# Patient Record
Sex: FEMALE | Race: OTHER | HISPANIC OR LATINO | URBAN - METROPOLITAN AREA
[De-identification: names, ages, dates, MRNs, and addresses within clinical notes are randomized per-mention and may not be internally consistent; named-entity substitution may affect disease eponyms.]

---

## 2019-04-24 ENCOUNTER — EMERGENCY (EMERGENCY)
Facility: HOSPITAL | Age: 59
LOS: 0 days | Discharge: HOME | End: 2019-04-24
Payer: SELF-PAY

## 2019-04-24 VITALS
RESPIRATION RATE: 18 BRPM | DIASTOLIC BLOOD PRESSURE: 76 MMHG | TEMPERATURE: 98 F | OXYGEN SATURATION: 100 % | WEIGHT: 130.07 LBS | SYSTOLIC BLOOD PRESSURE: 146 MMHG | HEART RATE: 64 BPM | HEIGHT: 62 IN

## 2019-04-24 PROCEDURE — 70450 CT HEAD/BRAIN W/O DYE: CPT | Mod: 26

## 2019-04-24 PROCEDURE — 99284 EMERGENCY DEPT VISIT MOD MDM: CPT

## 2019-04-24 PROCEDURE — 70486 CT MAXILLOFACIAL W/O DYE: CPT | Mod: 26

## 2019-04-24 PROCEDURE — 76377 3D RENDER W/INTRP POSTPROCES: CPT | Mod: 26

## 2019-04-24 PROCEDURE — 72125 CT NECK SPINE W/O DYE: CPT | Mod: 26

## 2019-04-24 RX ORDER — IBUPROFEN 200 MG
1 TABLET ORAL
Qty: 28 | Refills: 0
Start: 2019-04-24 | End: 2019-04-30

## 2019-04-24 RX ORDER — IBUPROFEN 200 MG
600 TABLET ORAL ONCE
Qty: 0 | Refills: 0 | Status: COMPLETED | OUTPATIENT
Start: 2019-04-24 | End: 2019-04-24

## 2019-04-24 RX ORDER — CYCLOBENZAPRINE HYDROCHLORIDE 10 MG/1
5 TABLET, FILM COATED ORAL ONCE
Qty: 0 | Refills: 0 | Status: COMPLETED | OUTPATIENT
Start: 2019-04-24 | End: 2019-04-24

## 2019-04-24 RX ORDER — CYCLOBENZAPRINE HYDROCHLORIDE 10 MG/1
1 TABLET, FILM COATED ORAL
Qty: 20 | Refills: 0
Start: 2019-04-24 | End: 2019-05-03

## 2019-04-24 RX ADMIN — CYCLOBENZAPRINE HYDROCHLORIDE 5 MILLIGRAM(S): 10 TABLET, FILM COATED ORAL at 18:21

## 2019-04-24 RX ADMIN — Medication 600 MILLIGRAM(S): at 18:20

## 2019-04-24 NOTE — ED PROVIDER NOTE - OBJECTIVE STATEMENT
this si a 59 yo s/p trip and fall c/o of face, head and pain x 1 day. Pt didn't take anything for pain. Denies nausea,  vomiting, fever, sob, chest pain, flank pain, dysuria, hematuria, trauma,  incontinence, rash, neuro deficit, blood thinners..

## 2019-04-24 NOTE — ED PROVIDER NOTE - CARE PROVIDER_API CALL
Reza Naranjo)  Internal Medicine  [address], [address]  [place] [address]  Phone: [phone]  Fax: [phone]  Follow Up Time:

## 2019-04-24 NOTE — ED PROVIDER NOTE - ENMT, MLM
Airway patent, Nasal mucosa clear. Mouth with normal mucosa. Throat has no vesicles, no oropharyngeal exudates and uvula is midline. no point TENDERNESS

## 2019-04-24 NOTE — ED ADULT NURSE NOTE - CHPI ED NUR SYMPTOMS NEG
no fever/no tingling/no confusion/no loss of consciousness/no numbness/no abrasion/no bleeding/no deformity/no vomiting/no weakness

## 2019-04-24 NOTE — ED ADULT TRIAGE NOTE - CHIEF COMPLAINT QUOTE
c/o pain l neck l arm and l leg s/p slip and fall in store last night +hit head denies loc denies anticoagulation use denies n/v denies dizziness

## 2019-04-24 NOTE — ED PROVIDER NOTE - CARE PLAN
Principal Discharge DX:	Cervical strain, acute, initial encounter  Secondary Diagnosis:	Facial contusion, initial encounter  Secondary Diagnosis:	Head injuries, initial encounter

## 2019-04-26 DIAGNOSIS — S00.83XA CONTUSION OF OTHER PART OF HEAD, INITIAL ENCOUNTER: ICD-10-CM
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
## 2019-04-26 DIAGNOSIS — R51 HEADACHE: ICD-10-CM

## 2019-04-26 DIAGNOSIS — S16.1XXA STRAIN OF MUSCLE, FASCIA AND TENDON AT NECK LEVEL, INITIAL ENCOUNTER: ICD-10-CM
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
## 2019-04-26 DIAGNOSIS — M54.2 CERVICALGIA: ICD-10-CM

## 2022-08-19 ENCOUNTER — EMERGENCY (EMERGENCY)
Facility: HOSPITAL | Age: 62
LOS: 0 days | Discharge: HOME | End: 2022-08-19
Attending: STUDENT IN AN ORGANIZED HEALTH CARE EDUCATION/TRAINING PROGRAM

## 2022-08-19 VITALS
HEART RATE: 100 BPM | OXYGEN SATURATION: 98 % | TEMPERATURE: 99 F | SYSTOLIC BLOOD PRESSURE: 166 MMHG | DIASTOLIC BLOOD PRESSURE: 89 MMHG | RESPIRATION RATE: 17 BRPM | WEIGHT: 134.92 LBS | HEIGHT: 62 IN

## 2022-08-19 VITALS
DIASTOLIC BLOOD PRESSURE: 72 MMHG | OXYGEN SATURATION: 97 % | SYSTOLIC BLOOD PRESSURE: 119 MMHG | RESPIRATION RATE: 16 BRPM | HEART RATE: 68 BPM

## 2022-08-19 DIAGNOSIS — R55 SYNCOPE AND COLLAPSE: ICD-10-CM

## 2022-08-19 LAB
ALBUMIN SERPL ELPH-MCNC: 3.6 G/DL — SIGNIFICANT CHANGE UP (ref 3.3–5)
ALP SERPL-CCNC: 56 U/L — SIGNIFICANT CHANGE UP (ref 40–120)
ALT FLD-CCNC: 25 U/L — SIGNIFICANT CHANGE UP (ref 12–78)
ANION GAP SERPL CALC-SCNC: 6 MMOL/L — SIGNIFICANT CHANGE UP (ref 5–17)
AST SERPL-CCNC: 18 U/L — SIGNIFICANT CHANGE UP (ref 15–37)
BASOPHILS # BLD AUTO: 0.05 K/UL — SIGNIFICANT CHANGE UP (ref 0–0.2)
BASOPHILS NFR BLD AUTO: 0.5 % — SIGNIFICANT CHANGE UP (ref 0–2)
BILIRUB SERPL-MCNC: 0.1 MG/DL — LOW (ref 0.2–1.2)
BUN SERPL-MCNC: 16 MG/DL — SIGNIFICANT CHANGE UP (ref 7–23)
CALCIUM SERPL-MCNC: 9 MG/DL — SIGNIFICANT CHANGE UP (ref 8.5–10.1)
CHLORIDE SERPL-SCNC: 107 MMOL/L — SIGNIFICANT CHANGE UP (ref 96–108)
CO2 SERPL-SCNC: 28 MMOL/L — SIGNIFICANT CHANGE UP (ref 22–31)
CREAT SERPL-MCNC: 0.96 MG/DL — SIGNIFICANT CHANGE UP (ref 0.5–1.3)
EGFR: 66 ML/MIN/1.73M2 — SIGNIFICANT CHANGE UP
EOSINOPHIL # BLD AUTO: 0.16 K/UL — SIGNIFICANT CHANGE UP (ref 0–0.5)
EOSINOPHIL NFR BLD AUTO: 1.8 % — SIGNIFICANT CHANGE UP (ref 0–6)
ETHANOL SERPL-MCNC: <10 MG/DL — SIGNIFICANT CHANGE UP (ref 0–10)
GLUCOSE BLDC GLUCOMTR-MCNC: 194 MG/DL — HIGH (ref 70–99)
GLUCOSE SERPL-MCNC: 177 MG/DL — HIGH (ref 70–99)
HCT VFR BLD CALC: 36.6 % — SIGNIFICANT CHANGE UP (ref 34.5–45)
HGB BLD-MCNC: 12.1 G/DL — SIGNIFICANT CHANGE UP (ref 11.5–15.5)
IMM GRANULOCYTES NFR BLD AUTO: 0.3 % — SIGNIFICANT CHANGE UP (ref 0–1.5)
LYMPHOCYTES # BLD AUTO: 1.67 K/UL — SIGNIFICANT CHANGE UP (ref 1–3.3)
LYMPHOCYTES # BLD AUTO: 18.4 % — SIGNIFICANT CHANGE UP (ref 13–44)
MCHC RBC-ENTMCNC: 25.8 PG — LOW (ref 27–34)
MCHC RBC-ENTMCNC: 33.1 G/DL — SIGNIFICANT CHANGE UP (ref 32–36)
MCV RBC AUTO: 78 FL — LOW (ref 80–100)
MONOCYTES # BLD AUTO: 0.67 K/UL — SIGNIFICANT CHANGE UP (ref 0–0.9)
MONOCYTES NFR BLD AUTO: 7.4 % — SIGNIFICANT CHANGE UP (ref 2–14)
NEUTROPHILS # BLD AUTO: 6.52 K/UL — SIGNIFICANT CHANGE UP (ref 1.8–7.4)
NEUTROPHILS NFR BLD AUTO: 71.6 % — SIGNIFICANT CHANGE UP (ref 43–77)
NRBC # BLD: 0 /100 WBCS — SIGNIFICANT CHANGE UP (ref 0–0)
PLATELET # BLD AUTO: 293 K/UL — SIGNIFICANT CHANGE UP (ref 150–400)
POTASSIUM SERPL-MCNC: 3.9 MMOL/L — SIGNIFICANT CHANGE UP (ref 3.5–5.3)
POTASSIUM SERPL-SCNC: 3.9 MMOL/L — SIGNIFICANT CHANGE UP (ref 3.5–5.3)
PROT SERPL-MCNC: 7.7 GM/DL — SIGNIFICANT CHANGE UP (ref 6–8.3)
RBC # BLD: 4.69 M/UL — SIGNIFICANT CHANGE UP (ref 3.8–5.2)
RBC # FLD: 15.3 % — HIGH (ref 10.3–14.5)
SODIUM SERPL-SCNC: 141 MMOL/L — SIGNIFICANT CHANGE UP (ref 135–145)
TROPONIN I, HIGH SENSITIVITY RESULT: 6.7 NG/L — SIGNIFICANT CHANGE UP
WBC # BLD: 9.1 K/UL — SIGNIFICANT CHANGE UP (ref 3.8–10.5)
WBC # FLD AUTO: 9.1 K/UL — SIGNIFICANT CHANGE UP (ref 3.8–10.5)

## 2022-08-19 PROCEDURE — 70450 CT HEAD/BRAIN W/O DYE: CPT | Mod: 26,MA

## 2022-08-19 PROCEDURE — 71045 X-RAY EXAM CHEST 1 VIEW: CPT | Mod: 26

## 2022-08-19 PROCEDURE — 99285 EMERGENCY DEPT VISIT HI MDM: CPT

## 2022-08-19 PROCEDURE — 72125 CT NECK SPINE W/O DYE: CPT | Mod: 26,MA

## 2022-08-19 NOTE — ED PROVIDER NOTE - ATTENDING CONTRIBUTION TO CARE
Dichter: Pt seen w/ PGY3 EM resident - 61F presents to ED s/p unwitnessed syncope PTA. Pt reports went upstairs to put something away, next thing she remembers on couch w/  tending to her. Denies prior syncopal episode. + head strike, + pain to R occiput. Pt admits to having mojito today and states typically does not drink alcohol. Pt w/ sensation of feeling warm prior to syncope, w/o other prodromal symptoms. AF, VSS. Well appearing, in NAD. Agree w/ planned w/u and dispo. R/o intracranial pathology, ACS.

## 2022-08-19 NOTE — ED PROVIDER NOTE - PROGRESS NOTE DETAILS
Results reported to patient--grossly benign, CT, labs unremarkable   Pt. reports feeling better after meds, no events during stay   pt. agrees to f/u with primary care outpt., referred to cardio for urgent f/u given complaints   pt. understands to return to ED if symptoms worsen; will d/c as per plan with primary attending of record  likely vagal syncope, pt. encouraged to hydrate and eat healthy

## 2022-08-19 NOTE — ED PROVIDER NOTE - CLINICAL SUMMARY MEDICAL DECISION MAKING FREE TEXT BOX
Marcelino - Pt is a 62 yo F who presents with syncope. ddx: vasovagal vs orthostatic vs cardiogenic, neurogenic less likely. Will check ekg, cxr, cbc, cmp, trop and keep on monitor. If workup neg, close cards follow up outpt

## 2022-08-19 NOTE — ED PROVIDER NOTE - NS ED ROS FT
GENERAL: No fever, chills  EYES: no vision changes, no discharge.   ENT: no difficulty swallowing or speaking   CARDIAC: no chest pain/pressure, SOB, lower extremity swelling  PULMONARY: no cough, SOB  GI: no abdominal pain, n/v/d  : no dysuria  SKIN: no rashes  NEURO: no headache, lightheadedness, paresthesia. +syncope   MSK: No joint pain, myalgia, weakness.

## 2022-08-19 NOTE — ED PROVIDER NOTE - NSFOLLOWUPINSTRUCTIONS_ED_ALL_ED_FT
You were seen in the ED for syncope.    Your head CT and blood work were reassuring.    Please follow up with a cardiologist in 2-3 days for further management.    Return to the ED if you experience any worsening or new symptoms or any symptoms that concern you, including fevers, chills, shortness of breath, chest pain, syncope.

## 2022-08-19 NOTE — ED PROVIDER NOTE - PHYSICAL EXAMINATION
Araseli Benson MD  GENERAL: Patient awake alert NAD.  HEENT: NC/AT, Moist mucous membranes, PERRL, EOMI.  LUNGS: CTAB, no wheezes or crackles.   CARDIAC: RRR, no m/r/g.    ABDOMEN: Soft, NT, ND, No rebound, guarding. No CVA tenderness.   EXT: No edema. No calf tenderness.   MSK: No spinal tenderness, no pain with movement, no deformities.  NEURO: A&Ox3. Moving all extremities. cn 2-12 intact. strength and sensation intact   SKIN: Warm and dry. No rash.  PSYCH: Normal affect.

## 2022-08-19 NOTE — ED PROVIDER NOTE - PATIENT PORTAL LINK FT
You can access the FollowMyHealth Patient Portal offered by [facility] by registering at the following website: [email or web address]. By joining [facility]’s FollowMyHealth portal, you will also be able to view your health information using other applications (apps) compatible with our system.

## 2022-08-19 NOTE — ED PROVIDER NOTE - OBJECTIVE STATEMENT
Pt is a 60 yo F who presents with syncope. Shortly prior to presentation she started feeling hot. Went upstairs and suddenly syncopized. Does not remember anything else until she woke up on the couch - her  had heard the fall and run to her. He did not see any seizure like activity, she did not bite her tongue or lose continence. She has never syncopized before. She denies CP, SOB, palpitations, N/V before or after. Says she had 1 alcoholic drink earlier today. Does not have a cardiologist.

## 2022-08-19 NOTE — ED ADULT NURSE NOTE - OBJECTIVE STATEMENT
Patient is alert and oriented x4. Had mojito for lunch then after 1 hour passed out and fell at home, hit the back of her head on the table. Complains of pain on area. Pain score is 6/10. Denies any dizziness, n/v or blurred vision. Denies taking blood thinners. Able to ambulate.

## 2022-08-20 PROBLEM — Z78.9 OTHER SPECIFIED HEALTH STATUS: Chronic | Status: ACTIVE | Noted: 2019-04-24

## 2022-08-23 PROBLEM — Z00.00 ENCOUNTER FOR PREVENTIVE HEALTH EXAMINATION: Status: ACTIVE | Noted: 2022-08-23

## 2022-08-24 ENCOUNTER — NON-APPOINTMENT (OUTPATIENT)
Age: 62
End: 2022-08-24

## 2022-08-24 ENCOUNTER — APPOINTMENT (OUTPATIENT)
Dept: CARDIOLOGY | Facility: CLINIC | Age: 62
End: 2022-08-24

## 2022-08-24 VITALS
TEMPERATURE: 97.7 F | BODY MASS INDEX: 25.72 KG/M2 | DIASTOLIC BLOOD PRESSURE: 73 MMHG | OXYGEN SATURATION: 99 % | SYSTOLIC BLOOD PRESSURE: 152 MMHG | WEIGHT: 138 LBS | HEART RATE: 62 BPM | HEIGHT: 61.5 IN

## 2022-08-24 DIAGNOSIS — R55 SYNCOPE AND COLLAPSE: ICD-10-CM

## 2022-08-24 PROCEDURE — 99204 OFFICE O/P NEW MOD 45 MIN: CPT | Mod: 25

## 2022-08-24 PROCEDURE — 93000 ELECTROCARDIOGRAM COMPLETE: CPT

## 2022-08-24 NOTE — DISCUSSION/SUMMARY
[FreeTextEntry1] : 61F with no pmhx presents to establish CV care\par \par 1. syncope\par -symptoms suggestive of vasovagal, less likely cardiac\par -pt euvolemic, no hx of cad, no hx of cp or medeiros\par -ekg showing SR\par -check TTE to r/o structural heart dz\par -if symptoms recur will consider further w/u with stress testing and holter. \par -f/u after echo. \par \par >45 min spent on complete encounter.  [EKG obtained to assist in diagnosis and management of assessed problem(s)] : EKG obtained to assist in diagnosis and management of assessed problem(s)

## 2022-08-24 NOTE — PHYSICAL EXAM
[Well Developed] : well developed [Well Nourished] : well nourished [No Acute Distress] : no acute distress [Normal Venous Pressure] : normal venous pressure [No Murmur] : no murmur [Clear Lung Fields] : clear lung fields [Good Air Entry] : good air entry [No Respiratory Distress] : no respiratory distress  [Soft] : abdomen soft [Non Tender] : non-tender [Normal Gait] : normal gait [No Edema] : no edema [Moves all extremities] : moves all extremities [No Focal Deficits] : no focal deficits [Alert and Oriented] : alert and oriented

## 2022-08-24 NOTE — HISTORY OF PRESENT ILLNESS
[FreeTextEntry1] : 61F presents to establish care\par Sent in by: [facility] ER d/c\par PMD: [facility]\par \par  present for complete encounter. \par \par pt had a syncopal episode, pt was at home, pt came home from lunch, had a drink at lunch, went upstairs to lay down, then got up and went upstairs, states it was very hot. pt states an acute onset of diaphoresis and then fainted.  heard a thump, went up and found her, awake but dazed, confused. states she doesn’t remember him calling 911, didn’t remember going out for lunch. \par pt denies prev episodes of syncope. \par \par pt denies CP, SOB, at rest or on exertion. Denies palpitations, dizziness, LE edema, orthopnea\par \par \par Prev cardiac history: none\par \par Exercise: daily walking 3 miles, no issues \par Diet:\par \par Previous cardiac testing: none\par Recent labs:\par \par \par EKG: SR 59\par \par \par Med hx: none\par OBGYN hx: 2 sons. + Hx of gestational DM.  No Hx of miscarriage. post menopause. \par Sx hx: none\par Family hx: no known cardiac hx\par Social hx: originally from [facility].  lives in [place] with  and one son. no tob. rare etoh. no drugs. \par Meds: none\par Allergies: nkda\par \par

## 2022-08-24 NOTE — REVIEW OF SYSTEMS
[Feeling Fatigued] : feeling fatigued [Fever] : no fever [Headache] : no headache [Weight Gain (___ Lbs)] : no recent weight gain [Chills] : no chills [Weight Loss (___ Lbs)] : no recent weight loss [Blurry Vision] : no blurred vision [Sore Throat] : no sore throat [SOB] : no shortness of breath [Dyspnea on exertion] : not dyspnea during exertion [Chest Discomfort] : no chest discomfort [Lower Ext Edema] : no extremity edema [Syncope] : no syncope [Cough] : no cough [Wheezing] : no wheezing [Nausea] : no nausea [Vomiting] : no vomiting [Dizziness] : no dizziness [Confusion] : no confusion was observed [Easy Bleeding] : no tendency for easy bleeding [Easy Bruising] : no tendency for easy bruising

## 2022-08-31 ENCOUNTER — APPOINTMENT (OUTPATIENT)
Dept: CARDIOLOGY | Facility: CLINIC | Age: 62
End: 2022-08-31

## 2022-08-31 PROCEDURE — 93306 TTE W/DOPPLER COMPLETE: CPT

## 2022-12-14 NOTE — ED ADULT TRIAGE NOTE - WEIGHT IN LBS
Patient Quality Outreach    Patient is due for the following:   Hypertension -  BP check    Next Steps:   Patient has upcoming appointment, these items will be addressed at that time. Appointment on 12/23/2022    Type of outreach:    did not contact      Questions for provider review:    None     Diane L. Schoenherr, RN        
[phone]

## 2025-03-04 ENCOUNTER — OFFICE (OUTPATIENT)
Facility: LOCATION | Age: 65
Setting detail: OPHTHALMOLOGY
End: 2025-03-04
Payer: MEDICARE

## 2025-03-04 DIAGNOSIS — H02.825: ICD-10-CM

## 2025-03-04 DIAGNOSIS — H02.401: ICD-10-CM

## 2025-03-04 PROCEDURE — 99203 OFFICE O/P NEW LOW 30 MIN: CPT | Performed by: OPHTHALMOLOGY

## 2025-03-04 ASSESSMENT — VISUAL ACUITY
OS_BCVA: 20/25+1
OD_BCVA: 20/20

## 2025-03-04 ASSESSMENT — LID POSITION - PTOSIS
OD_PTOSIS: RUL 1+
OS_PTOSIS: ABSENT

## 2025-03-04 ASSESSMENT — CONFRONTATIONAL VISUAL FIELD TEST (CVF)
OS_FINDINGS: FULL
OD_FINDINGS: FULL